# Patient Record
Sex: FEMALE | Race: AMERICAN INDIAN OR ALASKA NATIVE
[De-identification: names, ages, dates, MRNs, and addresses within clinical notes are randomized per-mention and may not be internally consistent; named-entity substitution may affect disease eponyms.]

---

## 2019-07-20 ENCOUNTER — HOSPITAL ENCOUNTER (EMERGENCY)
Dept: HOSPITAL 5 - ED | Age: 34
LOS: 1 days | Discharge: HOME | End: 2019-07-21
Payer: MEDICAID

## 2019-07-20 DIAGNOSIS — Z79.899: ICD-10-CM

## 2019-07-20 DIAGNOSIS — N39.0: Primary | ICD-10-CM

## 2019-07-20 DIAGNOSIS — F17.200: ICD-10-CM

## 2019-07-20 LAB
ALBUMIN SERPL-MCNC: 4 G/DL (ref 3.9–5)
ALT SERPL-CCNC: 10 UNITS/L (ref 7–56)
BASOPHILS # (AUTO): 0.1 K/MM3 (ref 0–0.1)
BASOPHILS NFR BLD AUTO: 1.1 % (ref 0–1.8)
BUN SERPL-MCNC: 11 MG/DL (ref 7–17)
BUN/CREAT SERPL: 14 %
CALCIUM SERPL-MCNC: 9.6 MG/DL (ref 8.4–10.2)
EOSINOPHIL # BLD AUTO: 0 K/MM3 (ref 0–0.4)
EOSINOPHIL NFR BLD AUTO: 0.4 % (ref 0–4.3)
HCT VFR BLD CALC: 35.8 % (ref 30.3–42.9)
HEMOLYSIS INDEX: 4
HGB BLD-MCNC: 11.8 GM/DL (ref 10.1–14.3)
LYMPHOCYTES # BLD AUTO: 2.4 K/MM3 (ref 1.2–5.4)
LYMPHOCYTES NFR BLD AUTO: 37.1 % (ref 13.4–35)
MCHC RBC AUTO-ENTMCNC: 33 % (ref 30–34)
MCV RBC AUTO: 92 FL (ref 79–97)
MONOCYTES # (AUTO): 0.3 K/MM3 (ref 0–0.8)
MONOCYTES % (AUTO): 4.9 % (ref 0–7.3)
PLATELET # BLD: 146 K/MM3 (ref 140–440)
RBC # BLD AUTO: 3.89 M/MM3 (ref 3.65–5.03)

## 2019-07-20 PROCEDURE — 81001 URINALYSIS AUTO W/SCOPE: CPT

## 2019-07-20 PROCEDURE — 84703 CHORIONIC GONADOTROPIN ASSAY: CPT

## 2019-07-20 PROCEDURE — 84484 ASSAY OF TROPONIN QUANT: CPT

## 2019-07-20 PROCEDURE — 96375 TX/PRO/DX INJ NEW DRUG ADDON: CPT

## 2019-07-20 PROCEDURE — 36415 COLL VENOUS BLD VENIPUNCTURE: CPT

## 2019-07-20 PROCEDURE — 87086 URINE CULTURE/COLONY COUNT: CPT

## 2019-07-20 PROCEDURE — 85025 COMPLETE CBC W/AUTO DIFF WBC: CPT

## 2019-07-20 PROCEDURE — 80053 COMPREHEN METABOLIC PANEL: CPT

## 2019-07-20 PROCEDURE — 96374 THER/PROPH/DIAG INJ IV PUSH: CPT

## 2019-07-20 PROCEDURE — 74177 CT ABD & PELVIS W/CONTRAST: CPT

## 2019-07-20 PROCEDURE — 99284 EMERGENCY DEPT VISIT MOD MDM: CPT

## 2019-07-20 PROCEDURE — 96361 HYDRATE IV INFUSION ADD-ON: CPT

## 2019-07-20 PROCEDURE — 96376 TX/PRO/DX INJ SAME DRUG ADON: CPT

## 2019-07-20 PROCEDURE — 71046 X-RAY EXAM CHEST 2 VIEWS: CPT

## 2019-07-20 PROCEDURE — 83690 ASSAY OF LIPASE: CPT

## 2019-07-20 NOTE — CAT SCAN REPORT
CT ABDOMEN AND PELVIS WITH CONTRAST



HISTORY: Lower abdominal pain.



COMPARISON: No relevant prior imaging study available.



TECHNIQUE: Axial, coronal and sagittal CT imaging of the abdomen and pelvis was performed  after inje
ction of 100 mL Omnipaque 300 contrast.  All CT scans at this location are performed using CT dose re
duction for ALARA by means of automated exposure control.

 

FINDINGS: 



LOWER CHEST: No significant abnormality.

LIVER: No significant abnormality.

BILIARY: No significant abnormality.

PANCREAS: No significant abnormality.

SPLEEN: No significant abnormality.

ADRENALS: No significant abnormality.

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
tion just above the right iliac bone. No additional significant abnormality.



GI TRACT: No significant abnormality of the stomach, small bowel or colon.  The appendix is not well-
visualized. No suspicious infiltrate changes are noted along the right lower quadrant.

PERITONEUM: No free fluid. No free air. No fluid collection.

LYMPH NODES: No significant adenopathy.

VASCULATURE: No significant abnormality. 



URINARY BLADDER: No significant abnormality.

REPRODUCTIVE ORGANS: No significant abnormality.



ADDITIONAL FINDINGS: None.



SKELETAL SYSTEM: No significant abnormality.



IMPRESSION: 

1. No acute abnormality of the abdomen or pelvis.

2. Additional findings as above.



Signer Name: Edgar Sanders MD 

Signed: 7/20/2019 11:10 PM

 Workstation Name: [id number]

## 2019-07-20 NOTE — XRAY REPORT
CHEST 2 VIEWS 



INDICATION / CLINICAL INFORMATION:

MAIN: Chest Pain started this morning. 



COMPARISON: 

None available.



FINDINGS:



SUPPORT DEVICES: None.

HEART / MEDIASTINUM: No significant abnormality. 

LUNGS / PLEURA: No significant pulmonary or pleural abnormality. No pneumothorax. 



ADDITIONAL FINDINGS: No significant additional findings.



IMPRESSION:

1. No acute findings. 



Signer Name: Constantin Villa MD 

Signed: 7/20/2019 8:28 PM

 Workstation Name: [id number]

## 2019-07-21 VITALS — SYSTOLIC BLOOD PRESSURE: 132 MMHG | DIASTOLIC BLOOD PRESSURE: 78 MMHG

## 2019-07-21 LAB
BACTERIA #/AREA URNS HPF: (no result) /HPF
BILIRUB UR QL STRIP: (no result)
BLOOD UR QL VISUAL: (no result)
MUCOUS THREADS #/AREA URNS HPF: (no result) /HPF
PH UR STRIP: 5 [PH] (ref 5–7)
RBC #/AREA URNS HPF: > 182 /HPF (ref 0–6)
UROBILINOGEN UR-MCNC: < 2 MG/DL (ref ?–2)
WBC #/AREA URNS HPF: 29 /HPF (ref 0–6)

## 2019-07-21 NOTE — EMERGENCY DEPARTMENT REPORT
ED Abdominal Pain HPI





- General


Chief Complaint: Chest Pain


Stated Complaint: CHEST PAIN


Time Seen by Provider: 07/20/19 22:45


Source: patient


Mode of arrival: Wheelchair


Limitations: No Limitations





- History of Present Illness


MD Complaint: abdominal pain


-: Sudden, hour(s)


Location: LLQ


Radiation: none, LLQ


Severity scale (0 -10): 7


Quality: sharp


Consistency: constant


Improves With: nothing


Worsens With: movement


Associated Symptoms: denies: nausea, vomiting, diarrhea, constipation, dysuria, 

hematemesis, hematuria, anorexia, syncope





- Related Data


                                  Previous Rx's











 Medication  Instructions  Recorded  Last Taken  Type


 


Hyoscyamine Subl [Levsin Sl 0.125 0.125 mg SL Q6HR PRN #20 tab 07/20/19 Unknown 

Rx





TAB]    


 


Ketorolac [Toradol] 10 mg PO Q6H PRN #15 tablet 07/20/19 Unknown Rx


 


Ondansetron [Zofran ODT TAB] 8 mg PO Q12HR #14 tab.rapdis 07/20/19 Unknown Rx


 


Ciprofloxacin HCl [Ciprofloxacin 500 mg PO Q12HR #20 tab 07/21/19 Unknown Rx





TAB]    


 


Phenazopyridine [Pyridium] 200 mg PO TID #9 tab 07/21/19 Unknown Rx











                                    Allergies











Allergy/AdvReac Type Severity Reaction Status Date / Time


 


No Known Allergies Allergy   Verified 07/20/19 18:20














ED Review of Systems


ROS: 


Stated complaint: CHEST PAIN


Other details as noted in HPI





Comment: All other systems reviewed and negative





ED Past Medical Hx





- Social History


Smoking Status: Current Every Day Smoker


Substance Use Type: Alcohol





- Medications


Home Medications: 


                                Home Medications











 Medication  Instructions  Recorded  Confirmed  Last Taken  Type


 


Hyoscyamine Subl [Levsin Sl 0.125 0.125 mg SL Q6HR PRN #20 tab 07/20/19  Unknown

 Rx





TAB]     


 


Ketorolac [Toradol] 10 mg PO Q6H PRN #15 tablet 07/20/19  Unknown Rx


 


Ondansetron [Zofran ODT TAB] 8 mg PO Q12HR #14 tab.rapdis 07/20/19  Unknown Rx


 


Ciprofloxacin HCl [Ciprofloxacin 500 mg PO Q12HR #20 tab 07/21/19  Unknown Rx





TAB]     


 


Phenazopyridine [Pyridium] 200 mg PO TID #9 tab 07/21/19  Unknown Rx














ED Physical Exam





- General


Limitations: No Limitations


General appearance: alert, in no apparent distress





- Head


Head exam: Present: atraumatic, normocephalic





- Eye


Eye exam: Present: normal appearance





- ENT


ENT exam: Present: normal exam, mucous membranes moist





- Neck


Neck exam: Present: normal inspection





- Respiratory


Respiratory exam: Present: normal lung sounds bilaterally.  Absent: respiratory 

distress





- Cardiovascular


Cardiovascular Exam: Present: regular rate, normal rhythm.  Absent: systolic 

murmur, diastolic murmur, rubs, gallop





- GI/Abdominal


GI/Abdominal exam: Present: soft, tenderness (left lower quadrant.  Rovsing sign

 is negative.  T sign, no grey carbajal, randy and mcburney's,), normal bowel 

sounds





- Extremities Exam


Extremities exam: Present: normal inspection, full ROM, normal capillary refill





- Back Exam


Back exam: Present: normal inspection





- Neurological Exam


Neurological exam: Present: alert, oriented X3, CN II-XII intact





- Psychiatric


Psychiatric exam: Present: normal affect, normal mood





- Skin


Skin exam: Present: warm, dry, intact, normal color.  Absent: rash





ED Course


                                   Vital Signs











  07/20/19 07/20/19 07/20/19





  18:19 22:52 23:22


 


Temperature 98.7 F  


 


Pulse Rate 73  


 


Respiratory 16 16 18





Rate   


 


Blood Pressure 138/94  


 


O2 Sat by Pulse 99  





Oximetry   














  07/21/19 07/21/19 07/21/19





  00:06 00:08 00:31


 


Temperature   


 


Pulse Rate   


 


Respiratory 16 16 18





Rate   


 


Blood Pressure   


 


O2 Sat by Pulse   





Oximetry   














ED Medical Decision Making





- Lab Data


Result diagrams: 


                                 07/20/19 18:36





                                 07/20/19 18:36





- Radiology Data


Radiology results: report reviewed (CT scan is negative for acute pathology)





- Medical Decision Making





33-year-old gentleman female with onset of left lower quadrant abdominal pain.  

CT scan laboratory data showed no acute findings.  She did report having bloody 

urine.  Urinalysis did show evidence of urinary tract infection.  Will be 

covered.  She is tolerating oral with no complications.  There is no nausea or 

vomiting.  Pain is controlled with with with the medication.  Icing it.  She 

does report some pressure to the to the bladder region with urination


Critical care attestation.: 


If time is entered above; I have spent that time in minutes in the direct care 

of this critically ill patient, excluding procedure time.








ED Disposition


Clinical Impression: 


 Abdominal pain, UTI (urinary tract infection)





Disposition: DC-01 TO HOME OR SELFCARE


Is pt being admited?: No


Condition: Stable


Instructions:  Phenazopyridine (By mouth), Urinary Tract Infection in Women 

(ED), Dysuria (ED), Abdominal Pain (ED)


Additional Instructions: 


Please be sure to follow with the [facility] for your abdominal 

pain.  Follow was [facility] for urinary tract infection.  Reevaluation


Prescriptions: 


Ciprofloxacin HCl [Ciprofloxacin TAB] 500 mg PO Q12HR #20 tab


Hyoscyamine Subl [Levsin Sl 0.125 TAB] 0.125 mg SL Q6HR PRN #20 tab


 PRN Reason: abdominal cramps and spasms


Phenazopyridine [Pyridium] 200 mg PO TID #9 tab


Ketorolac [Toradol] 10 mg PO Q6H PRN #15 tablet


 PRN Reason: Pain


Ondansetron [Zofran ODT TAB] 8 mg PO Q12HR #14 tab.rapdis


Referrals: 


[facility] [Provider Group] - 3-5 Days


[facility] RIVERDALE,[facility], MD [Primary Care Provider] - 3-5 Days

## 2020-12-31 ENCOUNTER — HOSPITAL ENCOUNTER (EMERGENCY)
Dept: HOSPITAL 5 - ED | Age: 35
Discharge: HOME | End: 2020-12-31
Payer: SELF-PAY

## 2020-12-31 VITALS — SYSTOLIC BLOOD PRESSURE: 128 MMHG | DIASTOLIC BLOOD PRESSURE: 80 MMHG
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
## 2020-12-31 DIAGNOSIS — Z79.899: ICD-10-CM

## 2020-12-31 DIAGNOSIS — S93.401A: Primary | ICD-10-CM

## 2020-12-31 DIAGNOSIS — J45.909: ICD-10-CM
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
## 2020-12-31 DIAGNOSIS — Z98.890: ICD-10-CM

## 2020-12-31 NOTE — EVENT NOTE
ED Screening Note


ED Screening Note: 


pain and swelling right ankle sp rolling it this AM





This initial assessment/diagnostic orders/clinical plan/treatment(s) is/are 

subject to change based on patients health status, clinical progression and re-

assessment by fellow clinical providers in the ED. Further treatment and workup 

at subsequent clinical providers discretion. Patient/guardian urged not to elope

from the ED as their condition may be serious if not clinically assessed and 

managed. 





Initial orders include: 


xray ro fx

## 2020-12-31 NOTE — EMERGENCY DEPARTMENT REPORT
ED Extremity Problem HPI





- General


Chief complaint: Extremity Injury, Lower


Stated complaint: RT FOOT PAIN


Time Seen by Provider: 12/31/20 11:38


Source: patient


Mode of arrival: Wheelchair


Limitations: No Limitations





- History of Present Illness


Initial comments: 





34 yo sp rolling her r ankle this am. no fall. co swelling and pain with 

ambulation





no other injury 


MD Complaint: extremity pain


-: Gradual


Location: right


History of Same: No


Radiation: none


Quality: aching


Consistency: constant


Improves with: nothing


Worsens with: walking


Associated Symptoms: denies other symptoms





- Related Data


                                  Previous Rx's











 Medication  Instructions  Recorded  Last Taken  Type


 


Hyoscyamine Subl [Levsin Sl 0.125 0.125 mg SL Q6HR PRN #20 tab 07/20/19 Unknown 

Rx





TAB]    


 


Ketorolac [Toradol] 10 mg PO Q6H PRN #15 tablet 07/20/19 Unknown Rx


 


Ondansetron [Zofran ODT TAB] 8 mg PO Q12HR #14 tab.rapdis 07/20/19 Unknown Rx


 


Ciprofloxacin HCl [Ciprofloxacin 500 mg PO Q12HR #20 tab 07/21/19 Unknown Rx





TAB]    


 


Phenazopyridine [Pyridium] 200 mg PO TID #9 tab 07/21/19 Unknown Rx


 


Naproxen [Naprosyn TAB] 500 mg PO BID #20 tablet 06/22/20 Unknown Rx


 


Nitrofurantoin Mono/M-Cryst 100 mg PO Q12HR #20 capsule 06/22/20 Unknown Rx





[Macrobid CAP]    


 


Ibuprofen [Motrin] 800 mg PO Q8HR PRN #30 tablet 12/31/20 Unknown Rx











                                    Allergies











Allergy/AdvReac Type Severity Reaction Status Date / Time


 


No Known Allergies Allergy   Verified 12/31/20 11:33














ED Review of Systems


ROS: 


Stated complaint: RT FOOT PAIN


Other details as noted in HPI





Comment: All other systems reviewed and negative





ED Past Medical Hx





- Past Medical History


Previous Medical History?: Yes


Hx Asthma: Yes





- Surgical History


Past Surgical History?: Yes


Additional Surgical History: ectopic pregnancy





- Family History


Family history: no significant





- Social History


Smoking Status: Never Smoker


Substance Use Type: None





- Medications


Home Medications: 


                                Home Medications











 Medication  Instructions  Recorded  Confirmed  Last Taken  Type


 


Hyoscyamine Subl [Levsin Sl 0.125 0.125 mg SL Q6HR PRN #20 tab 07/20/19  Unknown

 Rx





TAB]     


 


Ketorolac [Toradol] 10 mg PO Q6H PRN #15 tablet 07/20/19  Unknown Rx


 


Ondansetron [Zofran ODT TAB] 8 mg PO Q12HR #14 tab.rapdis 07/20/19  Unknown Rx


 


Ciprofloxacin HCl [Ciprofloxacin 500 mg PO Q12HR #20 tab 07/21/19  Unknown Rx





TAB]     


 


Phenazopyridine [Pyridium] 200 mg PO TID #9 tab 07/21/19  Unknown Rx


 


Naproxen [Naprosyn TAB] 500 mg PO BID #20 tablet 06/22/20  Unknown Rx


 


Nitrofurantoin Mono/M-Cryst 100 mg PO Q12HR #20 capsule 06/22/20  Unknown Rx





[Macrobid CAP]     


 


Ibuprofen [Motrin] 800 mg PO Q8HR PRN #30 tablet 12/31/20  Unknown Rx














ED Physical Exam





- General


Limitations: No Limitations


General appearance: alert, in no apparent distress





- Head


Head exam: Present: atraumatic, normocephalic





- Eye


Eye exam: Present: normal appearance





- ENT


ENT exam: Present: mucous membranes moist





- Neck


Neck exam: Present: normal inspection





- Respiratory


Respiratory exam: Present: normal lung sounds bilaterally.  Absent: respiratory 

distress





- Cardiovascular


Cardiovascular Exam: Present: regular rate, normal rhythm.  Absent: systolic 

murmur, diastolic murmur, rubs, gallop





- GI/Abdominal


GI/Abdominal exam: Present: soft, normal bowel sounds





- Extremities Exam


Extremities exam: Present: normal inspection





- Expanded Lower Extremity Exam


  ** Right


Knee exam: Present: normal inspection


Lower Leg exam: Present: normal inspection


Ankle exam: Present: tenderness, swelling.  Absent: abrasion, laceration, 

ecchymosis, deformity, crepidus, dislocation


Foot/Toe exam: Present: normal inspection


Neuro vascular tendon exam: Present: no vascular compromise





- Back Exam


Back exam: Present: normal inspection





- Neurological Exam


Neurological exam: Present: alert, oriented X3





- Psychiatric


Psychiatric exam: Present: normal affect, normal mood





- Skin


Skin exam: Present: warm, dry, intact, normal color.  Absent: rash





ED Course


                                   Vital Signs











  12/31/20





  11:36


 


Temperature 98.3 F


 


Pulse Rate 109 H


 


Respiratory 18





Rate 


 


Blood Pressure 128/80


 


O2 Sat by Pulse 95





Oximetry 














ED Medical Decision Making





- Radiology Data


Radiology results: report reviewed, image reviewed





nap





- Medical Decision Making





xray neg





ice/crutches





motrin po 





dc home with medical management and ortho follow up





remains n/v intact





verbalizes understanding dc poc








                                   Vital Signs











  12/31/20





  11:36


 


Temperature 98.3 F


 


Pulse Rate 109 H


 


Respiratory 18





Rate 


 


Blood Pressure 128/80


 


O2 Sat by Pulse 95





Oximetry 








HR on exam 100 





- Differential Diagnosis


ro fx


Critical care attestation.: 


If time is entered above; I have spent that time in minutes in the direct care 

of this critically ill patient, excluding procedure time.








ED Disposition


Clinical Impression: 


 Ankle sprain





Disposition: DC-01 TO HOME OR SELFCARE


Is pt being admited?: No


Does the pt Need Aspirin: No


Condition: Stable


Instructions:  Ankle Sprain, Ankle Sprain, Easy-to-Read


Additional Instructions: 


ice


rest


elevate





ace and crutches for 72 hours


then follow up with ortho md


referral below





tylenol for pain





med as ordered today 


Prescriptions: 


Ibuprofen [Motrin] 800 mg PO Q8HR PRN #30 tablet


 PRN Reason: Pain, Moderate (4-6)


Referrals: 


ARACELI LOPEZ MD [Staff Physician] - 3-5 Days


ANTOINETTE DEAN MD [Staff Physician] - 3-5 Days


Time of Disposition: 12:04

## 2020-12-31 NOTE — XRAY REPORT
RIGHT ANKLE 3 VIEW(S)



INDICATION / CLINICAL INFORMATION:

SWELLING/ PAIN



COMPARISON:

None available.

 

FINDINGS:



BONES / JOINT(S): No acute fracture or subluxation. Ankle mortise is symmetric. No significant arthri
tis. 



SOFT TISSUES: There is soft tissue swelling of the medial hindfoot.



ADDITIONAL FINDINGS: None.



IMPRESSION:

Soft tissue swelling of the medial hindfoot. No acute osseous findings identified.



Signer Name: Constantin Persaud MD 

Signed: 12/31/2020 11:55 AM

Workstation Name: [id number]